# Patient Record
(demographics unavailable — no encounter records)

---

## 2019-01-01 NOTE — CIRCUMCISION NOTE
=================================================================

Circumcision Note

=================================================================

Datetime Report Generated by CPN: 2019 17:10

   

   

=================================================================

PRIOR TO PROCEDURE

=================================================================

   

Consent Signed:  Written Consent Signed and on Chart

Position:  Supine; Papoose Board

Circumcision Time Out:  Correct Patient Identity; Correct Side and Site

   are Marked; Accurate Procedure Consent Form; Agreement on Procedure

   to be Done; Safety Precautions Based on Patient History or

   Medication Use

   

=================================================================

PROCEDURE INFORMATION

=================================================================

   

Site Prep:  Chlorhexidine; Sterile Drape

Circumcision Date/Time:  2019 09:05

Circumcision Performed By::  Tal Craven MD

Equipment Used:  Gomco Clamp

Bell Size:  1.3

Systemic Medications:  Sweetease

Complications:  None

Status:  Excellent Cosmetic Outcome; Tolerated Procedure Well;

   Hemostatic

Parents Present:  None

Provider Procedure Note:  Consent Obtained. Prepped and draped in usual

   sterile fashion. Redundant foreskin excised with 1.3 Gomco.

   Excellent hemostasis. Vaseline gauze dressing applied.

   

=================================================================

SIGNATURE

=================================================================

   

Signature:  Electronically signed by Tal Craven MD ([email or web address]) on

   2019 at 09:09  with User ID: [email or web address]